# Patient Record
Sex: MALE | Race: BLACK OR AFRICAN AMERICAN | NOT HISPANIC OR LATINO | URBAN - METROPOLITAN AREA
[De-identification: names, ages, dates, MRNs, and addresses within clinical notes are randomized per-mention and may not be internally consistent; named-entity substitution may affect disease eponyms.]

---

## 2018-11-06 ENCOUNTER — HISTORICAL (OUTPATIENT)
Dept: ADMINISTRATIVE | Facility: HOSPITAL | Age: 38
End: 2018-11-06

## 2018-11-06 LAB
CHOLEST SERPL-MCNC: 128 MG/DL
CHOLEST/HDLC SERPL: 2.7 {RATIO} (ref 0–5)
HDLC SERPL-MCNC: 47 MG/DL
LDLC SERPL CALC-MCNC: 74 MG/DL (ref 0–130)
TRIGL SERPL-MCNC: 36 MG/DL
VLDLC SERPL CALC-MCNC: 7 MG/DL

## 2019-10-21 ENCOUNTER — HISTORICAL (OUTPATIENT)
Dept: ADMINISTRATIVE | Facility: HOSPITAL | Age: 39
End: 2019-10-21

## 2019-10-21 LAB
ABS NEUT (OLG): 2.26 X10(3)/MCL (ref 2.1–9.2)
ALBUMIN SERPL-MCNC: 4.1 GM/DL (ref 3.4–5)
ALBUMIN/GLOB SERPL: 1.1 RATIO (ref 1.1–2)
ALP SERPL-CCNC: 64 UNIT/L (ref 45–117)
ALT SERPL-CCNC: 39 UNIT/L (ref 12–78)
AST SERPL-CCNC: 27 UNIT/L (ref 15–37)
BASOPHILS # BLD AUTO: 0 X10(3)/MCL (ref 0–0.2)
BASOPHILS NFR BLD AUTO: 0 %
BILIRUB SERPL-MCNC: 1.5 MG/DL (ref 0.2–1)
BILIRUBIN DIRECT+TOT PNL SERPL-MCNC: 0.4 MG/DL (ref 0–0.2)
BILIRUBIN DIRECT+TOT PNL SERPL-MCNC: 1.1 MG/DL
BUN SERPL-MCNC: 12 MG/DL (ref 7–18)
CALCIUM SERPL-MCNC: 9 MG/DL (ref 8.5–10.1)
CHLORIDE SERPL-SCNC: 106 MMOL/L (ref 98–107)
CHOLEST SERPL-MCNC: 135 MG/DL
CHOLEST/HDLC SERPL: 2.5 {RATIO} (ref 0–5)
CO2 SERPL-SCNC: 27 MMOL/L (ref 21–32)
CREAT SERPL-MCNC: 1.1 MG/DL (ref 0.6–1.3)
EOSINOPHIL # BLD AUTO: 0 X10(3)/MCL (ref 0–0.9)
EOSINOPHIL NFR BLD AUTO: 0 %
ERYTHROCYTE [DISTWIDTH] IN BLOOD BY AUTOMATED COUNT: 12.4 % (ref 11.5–14.5)
GLOBULIN SER-MCNC: 3.6 GM/ML (ref 2.3–3.5)
GLUCOSE SERPL-MCNC: 97 MG/DL (ref 74–106)
HCT VFR BLD AUTO: 45.2 % (ref 40–51)
HDLC SERPL-MCNC: 55 MG/DL (ref 40–59)
HGB BLD-MCNC: 15.2 GM/DL (ref 13.5–17.5)
IMM GRANULOCYTES # BLD AUTO: 0.01 10*3/UL
IMM GRANULOCYTES NFR BLD AUTO: 0 %
LDLC SERPL CALC-MCNC: 72 MG/DL
LYMPHOCYTES # BLD AUTO: 1.5 X10(3)/MCL (ref 0.6–4.6)
LYMPHOCYTES NFR BLD AUTO: 36 %
MCH RBC QN AUTO: 29.6 PG (ref 26–34)
MCHC RBC AUTO-ENTMCNC: 33.6 GM/DL (ref 31–37)
MCV RBC AUTO: 87.9 FL (ref 80–100)
MONOCYTES # BLD AUTO: 0.4 X10(3)/MCL (ref 0.1–1.3)
MONOCYTES NFR BLD AUTO: 10 %
NEUTROPHILS # BLD AUTO: 2.26 X10(3)/MCL (ref 2.1–9.2)
NEUTROPHILS NFR BLD AUTO: 53 %
PLATELET # BLD AUTO: 150 X10(3)/MCL (ref 130–400)
PMV BLD AUTO: 11.6 FL (ref 7.4–10.4)
POTASSIUM SERPL-SCNC: 4 MMOL/L (ref 3.5–5.1)
PROT SERPL-MCNC: 7.7 GM/DL (ref 6.4–8.2)
RBC # BLD AUTO: 5.14 X10(6)/MCL (ref 4.5–5.9)
SODIUM SERPL-SCNC: 138 MMOL/L (ref 136–145)
TRIGL SERPL-MCNC: 38 MG/DL
VLDLC SERPL CALC-MCNC: 8 MG/DL
WBC # SPEC AUTO: 4.3 X10(3)/MCL (ref 4.5–11)

## 2020-12-02 ENCOUNTER — HISTORICAL (OUTPATIENT)
Dept: ADMINISTRATIVE | Facility: HOSPITAL | Age: 40
End: 2020-12-02

## 2020-12-02 LAB
ALBUMIN SERPL-MCNC: 4.2 GM/DL (ref 3.5–5)
ALBUMIN/GLOB SERPL: 1.4 RATIO (ref 1.1–2)
ALP SERPL-CCNC: 65 UNIT/L (ref 40–150)
ALT SERPL-CCNC: 29 UNIT/L (ref 0–55)
AST SERPL-CCNC: 29 UNIT/L (ref 5–34)
BILIRUB SERPL-MCNC: 1.9 MG/DL
BILIRUBIN DIRECT+TOT PNL SERPL-MCNC: 0.6 MG/DL (ref 0–0.5)
BILIRUBIN DIRECT+TOT PNL SERPL-MCNC: 1.3 MG/DL (ref 0–0.8)
BUN SERPL-MCNC: 13 MG/DL (ref 8.9–20.6)
CALCIUM SERPL-MCNC: 8.9 MG/DL (ref 8.4–10.2)
CHLORIDE SERPL-SCNC: 104 MMOL/L (ref 98–107)
CHOLEST SERPL-MCNC: 139 MG/DL
CHOLEST/HDLC SERPL: 3 {RATIO} (ref 0–5)
CO2 SERPL-SCNC: 26 MMOL/L (ref 22–29)
CREAT SERPL-MCNC: 0.97 MG/DL (ref 0.73–1.18)
EST. AVERAGE GLUCOSE BLD GHB EST-MCNC: 111.2 MG/DL
GLOBULIN SER-MCNC: 3.1 GM/DL (ref 2.4–3.5)
GLUCOSE SERPL-MCNC: 99 MG/DL (ref 74–100)
HBA1C MFR BLD: 5.5 %
HDLC SERPL-MCNC: 44 MG/DL (ref 35–60)
LDLC SERPL CALC-MCNC: 87 MG/DL (ref 50–140)
POTASSIUM SERPL-SCNC: 3.4 MMOL/L (ref 3.5–5.1)
PROT SERPL-MCNC: 7.3 GM/DL (ref 6.4–8.3)
SODIUM SERPL-SCNC: 136 MMOL/L (ref 136–145)
TRIGL SERPL-MCNC: 41 MG/DL (ref 34–140)
TSH SERPL-ACNC: 1.41 UIU/ML (ref 0.35–4.94)
VLDLC SERPL CALC-MCNC: 8 MG/DL

## 2021-01-07 ENCOUNTER — HISTORICAL (OUTPATIENT)
Dept: RADIOLOGY | Facility: HOSPITAL | Age: 41
End: 2021-01-07

## 2022-04-10 ENCOUNTER — HISTORICAL (OUTPATIENT)
Dept: ADMINISTRATIVE | Facility: HOSPITAL | Age: 42
End: 2022-04-10

## 2022-04-25 VITALS
DIASTOLIC BLOOD PRESSURE: 87 MMHG | OXYGEN SATURATION: 98 % | WEIGHT: 250 LBS | BODY MASS INDEX: 33.13 KG/M2 | HEIGHT: 73 IN | SYSTOLIC BLOOD PRESSURE: 148 MMHG
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
## 2022-05-03 NOTE — HISTORICAL [facility] CERNER
This is a historical note converted from R[employer]h. Formatting and pictures may have been removed.  Please reference Ramesh for original formatting and attached multimedia. Chief Complaint  F/U APPT FOR HTN  Follow up for HTN  History of Present Illness  Patient is a 40-year-old male with a past medical history of?hypertension?comes to the clinic today for regular follow-up exam.  ?  Hypertension-according to the patient he is?compliant with amlodipine 10 mg once daily. ?He forgot his blood pressure medication to take today in the morning?and is?anxious?regarding his visit today in the clinic. ?He does not measure his blood pressure?regularly.?Denies of blurry vision, headaches, chest pain, shortness of breath, palpitations, diaphoresis, presyncopal or syncopal episodes, bilateral lower extremity edema.  ?  Labs were on 10/2019.  ?  Health management  Declines for flu shot today  Non-smoker, nonalcoholic, non-IVDU.  Review of Systems  Constitutional:?no fever, fatigue, weakness  Respiratory:?no cough, no wheezing, no shortness of breath  Cardiovascular:?no chest pain, no palpitations, no edema  Gastrointestinal:?no nausea, vomiting, or diarrhea. No abdominal pain  Neurologic: no headache, no dizziness, no weakness or numbness  Physical Exam  Vitals & Measurements  T:?36.4? ?C (Oral)? HR:?66(Peripheral)? RR:?20? BP:?147/84?  HT:?185.00?cm? WT:?113.800?kg? BMI:?33.25?  General:?not in acute distress  Respiratory:?clear to auscultation bilaterally. No rales, wheezing, or rhonchi. Chest expansion symmetrical  Cardiovascular:?regular rate and rhythm without murmurs.  Gastrointestinal:?soft, non-tender, non-distended with normal bowel sounds, without masses to palpation  Psychological: Calm and cooperative.  Assessment/Plan  1.?Hypertension?I10  Continue amlodipine 10 mg daily  Patient states that he has white coat hypertension?thus not increasing amlodipine today  Recommend to bring the log?for blood pressure?in his next  visit  Follow low sodium diet (2 grams a day)  Control high blood pressure (?goal BP < 140/90)  Exercise at least 40 minutes a day, 5 days a week.  Recommend?10% of the body weight  Do not take NSAIDs (Ibuprofen, Naproxen, Aleve, Advil, Toradol, Mobic), may take only Tylenol as needed for pain/headaches.  Blood and urine?work results pending.  Follow-up with?log?in a month  Ordered:  amLODIPine, 10 mg = 1 tab(s), Oral, Daily, # 30 tab(s), 6 Refill(s), Pharmacy: [id number], 185, cm, Height/Length Dosing, 12/02/20 9:12:00 CST, 113.8, kg, Weight Dosing, 12/02/20 9:12:00 CST  Clinic Follow up, *Est. 01/02/21 3:00:00 CST, Order for future visit, Hypertension, [facility]  Comprehensive Metabolic Panel, Routine collect, *Est. 12/02/20 3:00:00 CST, Blood, Order for future visit, *Est. Stop date 12/02/20 3:00:00 CST, Lab Collect, Hypertension, Print Label By Order Location, 12/02/20 9:53:00 CST  Hemoglobin A1C [facility], Routine collect, *Est. 12/02/20 3:00:00 CST, Blood, Order for future visit, *Est. Stop date 12/02/20 3:00:00 CST, Lab Collect, Hypertension, Print Label By Order Location, 12/02/20 9:53:00 CST  Lipid Panel, Routine collect, *Est. 12/02/20 3:00:00 CST, Blood, Order for future visit, *Est. Stop date 12/02/20 3:00:00 CST, Lab Collect, Hypertension, Print Label By Order Location, 12/02/20 9:53:00 CST  Microalbum/Creatinine Ratio Urine (Microalb/Creat), Routine collect, Urine, 12/02/20 9:53:00 CST, Stop date 12/02/20 9:56:00 CST, Nurse collect, Hypertension, Print Label By Order Location  Thyroid Stimulating Hormone, Routine collect, 12/02/20 9:53:00 CST, Blood, Order for future visit, Stop date 12/02/20 9:53:00 CST, Lab Collect, Hypertension, 12/02/20 9:53:00 CST  Urinalysis with Microscopic if Indicated, Routine collect, Urine, 12/02/20 9:53:00 CST, Stop date 12/02/20 9:56:00 CST, Nurse collect, Hypertension, Print Label By Order Location  ?   Problem List/Past Medical  History  Ongoing  Hypertension  Obesity  Historical  No qualifying data  Procedure/Surgical History  none   Medications  amlodipine 10 mg oral tablet, 10 mg= 1 tab(s), Oral, Daily, 6 refills  Allergies  No Known Allergies  Social History  Abuse/Neglect  No, No, Yes, 12/02/2020  Alcohol - Denies Alcohol Use, 05/18/2014  Never, 10/21/2019  Employment/School  Employed, 06/17/2019  Exercise  Exercise duration: 20. Exercise frequency: 3-4 times/week. Exercise type: Walking., 06/17/2019  Home/Environment  Lives with Children, Spouse. TV/Computer concerns: No. Single family house, 06/17/2019  Nutrition/Health  Regular, 06/17/2019  Sexual  Sexually active: Yes., 06/17/2019  Spiritual/Cultural  Lutheran, No, 06/17/2019  Substance Use - Denies Substance Abuse, 05/18/2014  Tobacco - Denies Tobacco Use, 05/18/2014  Never (less than 100 in lifetime), N/A, Household tobacco concerns: No. Smokeless Tobacco Use: Never., 12/02/2020  Family History  Hypertension.: Father and Brother.  Immunizations  Vaccine Date Status   tetanus/diphtheria/pertussis, acel(Tdap) 01/14/2020 Given   Health Maintenance  Health Maintenance  ???Pending?(in the next year)  ??? ??OverDue  ??? ? ? ?Alcohol Misuse Screening due??01/02/20??and every 1??year(s)  ??? ? ? ?Hypertension Management-BMP due??10/20/20??and every 1??year(s)  ??? ??Due In Future?  ??? ? ? ?Obesity Screening not due until??01/01/21??and every 1??year(s)  ??? ? ? ?ADL Screening not due until??01/14/21??and every 1??year(s)  ???Satisfied?(in the past 1 year)  ??? ??Satisfied?  ??? ? ? ?ADL Screening on??01/14/20.??Satisfied by Marleny Cabrera LPN  ??? ? ? ?Blood Pressure Screening on??12/02/20.??Satisfied by Merly Haas LPN  ??? ? ? ?Body Mass Index Check on??12/02/20.??Satisfied by Merly Haas LPN  ??? ? ? ?Depression Screening on??12/02/20.??Satisfied by Merly Haas LPN  ??? ? ? ?Hypertension Management-Blood Pressure on??12/02/20.??Satisfied by Samina REVELES,  Merly  ??? ? ? ?Influenza Vaccine on??12/02/20.??Satisfied by Merly Haas LPN  ??? ? ? ?Obesity Screening on??12/02/20.??Satisfied by Merly Haas LPN  ??? ? ? ?Tetanus Vaccine on??01/14/20.??Satisfied by Rosa Alaniz  ?      Called the patient to let him know about his lab work results. Also gave an update regarding elevated bilirubin. He agrees to the plan of getting an abdominal US   ?I have read the residents note?and the management and plan are reasonable and appropriate.

## 2022-07-21 RX ORDER — AMLODIPINE BESYLATE 10 MG/1
10 TABLET ORAL DAILY
COMMUNITY
Start: 2022-06-20 | End: 2022-07-21 | Stop reason: SDUPTHER

## 2022-07-22 RX ORDER — AMLODIPINE BESYLATE 10 MG/1
10 TABLET ORAL DAILY
Qty: 30 TABLET | Refills: 0 | Status: SHIPPED | OUTPATIENT
Start: 2022-07-22 | End: 2022-09-23 | Stop reason: SDUPTHER

## 2022-09-23 ENCOUNTER — OFFICE VISIT (OUTPATIENT)
Dept: FAMILY MEDICINE | Facility: CLINIC | Age: 42
End: 2022-09-23
Payer: COMMERCIAL

## 2022-09-23 VITALS
OXYGEN SATURATION: 98 % | BODY MASS INDEX: 32.86 KG/M2 | RESPIRATION RATE: 18 BRPM | HEIGHT: 72 IN | SYSTOLIC BLOOD PRESSURE: 146 MMHG | DIASTOLIC BLOOD PRESSURE: 80 MMHG | WEIGHT: 242.63 LBS | HEART RATE: 91 BPM | TEMPERATURE: 98 F

## 2022-09-23 DIAGNOSIS — I10 HYPERTENSION, UNSPECIFIED TYPE: Primary | ICD-10-CM

## 2022-09-23 PROCEDURE — 99214 OFFICE O/P EST MOD 30 MIN: CPT | Mod: PBBFAC

## 2022-09-23 RX ORDER — CYCLOBENZAPRINE HCL 10 MG
10 TABLET ORAL 3 TIMES DAILY
COMMUNITY
Start: 2022-06-23

## 2022-09-23 RX ORDER — HYDROCODONE BITARTRATE AND ACETAMINOPHEN 7.5; 325 MG/1; MG/1
1 TABLET ORAL 2 TIMES DAILY
COMMUNITY
Start: 2022-06-23 | End: 2023-04-03

## 2022-09-23 RX ORDER — SILDENAFIL 25 MG/1
25 TABLET, FILM COATED ORAL DAILY
Qty: 6 TABLET | Refills: 6 | Status: SHIPPED | OUTPATIENT
Start: 2022-09-23 | End: 2023-03-25 | Stop reason: SDUPTHER

## 2022-09-23 RX ORDER — SILDENAFIL 25 MG/1
25 TABLET, FILM COATED ORAL DAILY
COMMUNITY
Start: 2022-06-14 | End: 2022-09-23 | Stop reason: SDUPTHER

## 2022-09-23 RX ORDER — MELOXICAM 15 MG/1
15 TABLET ORAL DAILY
COMMUNITY
Start: 2022-07-19

## 2022-09-23 RX ORDER — AMLODIPINE BESYLATE 10 MG/1
10 TABLET ORAL DAILY
Qty: 90 TABLET | Refills: 1 | Status: SHIPPED | OUTPATIENT
Start: 2022-09-23 | End: 2023-03-25 | Stop reason: SDUPTHER

## 2022-09-23 NOTE — PROGRESS NOTES
[facility] OFFICE VISIT NOTE  Avi Crawford Jr  [id number]  09/23/2022      Chief Complaint: Medication Refill      HPI    Avi Crawford Jr is a 42 y.o. male  presenting to [facility] for follow up of HTN.    Hypertension   -Compliant with amlodipine 10 mg daily  -Denies chest pain, shortness a breath, headaches   -BP at home: 130-140/80's  -did not take medication this morning.  Has been out of his medication for least a week.    Sexual problem  -patient was given Viagra at his last apt and requesting refills  -does not take nitrates       ROS:  CONSTITUTIONAL: No weight loss, fever, chills, or weakness.    CARDIOVASCULAR: No chest pain, chest pressure, or chest discomfort. No palpitations.    RESPIRATORY: No shortness of breath, cough, or sputum.    GASTROINTESTINAL: No nausea, vomiting or diarrhea. No abdominal pain.    Vitals:    09/23/22 0932   BP: (!) 146/80   Pulse: 91   Resp: 18   Temp: 98.4 °F (36.9 °C)       PE:  General: appears well, in no acute distress   Neck: no carotid bruits   Respiratory: clear to auscultation bilaterally, nonlabored respirations   Cardiovascular: regular rate and rhythm without murmurs or gallops, no edema in bilateral lower extremities     Current Medications:   Current Outpatient Medications   Medication Sig Dispense Refill    HYDROcodone-acetaminophen (NORCO) 7.5-325 mg per tablet Take 1 tablet by mouth 2 (two) times daily.      meloxicam (MOBIC) 15 MG tablet Take 15 mg by mouth once daily.      amLODIPine (NORVASC) 10 MG tablet Take 1 tablet (10 mg total) by mouth once daily. 90 tablet 1    cyclobenzaprine (FLEXERIL) 10 MG tablet Take 10 mg by mouth 3 (three) times daily.      sildenafiL (VIAGRA) 25 MG tablet Take 1 tablet (25 mg total) by mouth once daily. 6 tablet 6     No current facility-administered medications for this visit.       Assessment:   1. Hypertension, unspecified type    2. Abnormal sexual function        Plan:    HTN  -Continue amlodipine 10 mg qd, refills  provided today  -Low Sodium Diet (DASH Diet - Less than 2 grams of sodium per day).  -Monitor blood pressure daily and log. Report consistent numbers greater than 140/90.  -Maintain healthy weight with goal BMI <30. Exercise 30 minutes per day, 5 days per week.    Sexual Problem  -continue Viagra 25 mg  PRN, refills provided today   -avoid nitrates    Return to clinic in 6 months     Marivel Lawton M.D.  [facility]

## 2022-09-24 NOTE — PROGRESS NOTES
Date of encounter 09-23-22.  Resident's note reviewed 09-24-22.  Agree with assessment; plan of care appropriate.  Professional services provided in an outpatient primary care center affiliated with a teaching institution.

## 2023-03-25 DIAGNOSIS — F52.9 PROBLEM WITH SEXUAL FUNCTION: ICD-10-CM

## 2023-03-25 DIAGNOSIS — I10 HYPERTENSION, UNSPECIFIED TYPE: ICD-10-CM

## 2023-03-25 RX ORDER — AMLODIPINE BESYLATE 10 MG/1
10 TABLET ORAL DAILY
Qty: 30 TABLET | Refills: 0 | Status: SHIPPED | OUTPATIENT
Start: 2023-03-25 | End: 2023-04-03 | Stop reason: SDUPTHER

## 2023-03-25 RX ORDER — SILDENAFIL 25 MG/1
25 TABLET, FILM COATED ORAL DAILY
Qty: 10 TABLET | Refills: 0 | Status: SHIPPED | OUTPATIENT
Start: 2023-03-25 | End: 2023-06-26 | Stop reason: SDUPTHER

## 2023-04-03 ENCOUNTER — OFFICE VISIT (OUTPATIENT)
Dept: FAMILY MEDICINE | Facility: CLINIC | Age: 43
End: 2023-04-03
Payer: MEDICAID

## 2023-04-03 VITALS
TEMPERATURE: 99 F | WEIGHT: 262.81 LBS | BODY MASS INDEX: 35.6 KG/M2 | SYSTOLIC BLOOD PRESSURE: 130 MMHG | OXYGEN SATURATION: 100 % | RESPIRATION RATE: 18 BRPM | DIASTOLIC BLOOD PRESSURE: 82 MMHG | HEIGHT: 72 IN | HEART RATE: 84 BPM

## 2023-04-03 DIAGNOSIS — I10 HYPERTENSION, UNSPECIFIED TYPE: Primary | ICD-10-CM

## 2023-04-03 LAB
ALBUMIN SERPL-MCNC: 4 G/DL (ref 3.5–5)
ALBUMIN/GLOB SERPL: 1.3 RATIO (ref 1.1–2)
ALP SERPL-CCNC: 64 UNIT/L (ref 40–150)
ALT SERPL-CCNC: 25 UNIT/L (ref 0–55)
AST SERPL-CCNC: 25 UNIT/L (ref 5–34)
BASOPHILS # BLD AUTO: 0.01 X10(3)/MCL (ref 0–0.2)
BASOPHILS NFR BLD AUTO: 0.2 %
BILIRUBIN DIRECT+TOT PNL SERPL-MCNC: 1.3 MG/DL
BUN SERPL-MCNC: 10.1 MG/DL (ref 8.9–20.6)
CALCIUM SERPL-MCNC: 9.1 MG/DL (ref 8.4–10.2)
CHLORIDE SERPL-SCNC: 105 MMOL/L (ref 98–107)
CHOLEST SERPL-MCNC: 146 MG/DL
CHOLEST/HDLC SERPL: 3 {RATIO} (ref 0–5)
CO2 SERPL-SCNC: 27 MMOL/L (ref 22–29)
CREAT SERPL-MCNC: 1.02 MG/DL (ref 0.73–1.18)
EOSINOPHIL # BLD AUTO: 0.07 X10(3)/MCL (ref 0–0.9)
EOSINOPHIL NFR BLD AUTO: 1.4 %
ERYTHROCYTE [DISTWIDTH] IN BLOOD BY AUTOMATED COUNT: 13.2 % (ref 11.5–17)
GFR SERPLBLD CREATININE-BSD FMLA CKD-EPI: >60 MLS/MIN/1.73/M2
GLOBULIN SER-MCNC: 3.1 GM/DL (ref 2.4–3.5)
GLUCOSE SERPL-MCNC: 87 MG/DL (ref 74–100)
HCT VFR BLD AUTO: 40.5 % (ref 42–52)
HDLC SERPL-MCNC: 45 MG/DL (ref 35–60)
HGB BLD-MCNC: 13.5 G/DL (ref 14–18)
IMM GRANULOCYTES # BLD AUTO: 0.02 X10(3)/MCL (ref 0–0.04)
IMM GRANULOCYTES NFR BLD AUTO: 0.4 %
LDLC SERPL CALC-MCNC: 86 MG/DL (ref 50–140)
LYMPHOCYTES # BLD AUTO: 2.17 X10(3)/MCL (ref 0.6–4.6)
LYMPHOCYTES NFR BLD AUTO: 42.6 %
MCH RBC QN AUTO: 29.6 PG (ref 27–31)
MCHC RBC AUTO-ENTMCNC: 33.3 G/DL (ref 33–36)
MCV RBC AUTO: 88.8 FL (ref 80–94)
MONOCYTES # BLD AUTO: 0.52 X10(3)/MCL (ref 0.1–1.3)
MONOCYTES NFR BLD AUTO: 10.2 %
NEUTROPHILS # BLD AUTO: 2.3 X10(3)/MCL (ref 2.1–9.2)
NEUTROPHILS NFR BLD AUTO: 45.2 %
NRBC BLD AUTO-RTO: 0 %
PLATELET # BLD AUTO: 162 X10(3)/MCL (ref 130–400)
PMV BLD AUTO: 11.3 FL (ref 7.4–10.4)
POTASSIUM SERPL-SCNC: 3.5 MMOL/L (ref 3.5–5.1)
PROT SERPL-MCNC: 7.1 GM/DL (ref 6.4–8.3)
RBC # BLD AUTO: 4.56 X10(6)/MCL (ref 4.7–6.1)
SODIUM SERPL-SCNC: 138 MMOL/L (ref 136–145)
TRIGL SERPL-MCNC: 75 MG/DL (ref 34–140)
VLDLC SERPL CALC-MCNC: 15 MG/DL
WBC # SPEC AUTO: 5.1 X10(3)/MCL (ref 4.5–11.5)

## 2023-04-03 PROCEDURE — 99214 OFFICE O/P EST MOD 30 MIN: CPT | Mod: PBBFAC

## 2023-04-03 PROCEDURE — 80053 COMPREHEN METABOLIC PANEL: CPT

## 2023-04-03 PROCEDURE — 80061 LIPID PANEL: CPT

## 2023-04-03 PROCEDURE — 36415 COLL VENOUS BLD VENIPUNCTURE: CPT

## 2023-04-03 PROCEDURE — 85025 COMPLETE CBC W/AUTO DIFF WBC: CPT

## 2023-04-03 RX ORDER — AMLODIPINE BESYLATE 10 MG/1
10 TABLET ORAL DAILY
Qty: 90 TABLET | Refills: 1 | Status: SHIPPED | OUTPATIENT
Start: 2023-04-03 | End: 2023-10-12 | Stop reason: SDUPTHER

## 2023-04-03 RX ORDER — NALOXONE HYDROCHLORIDE 4 MG/.1ML
SPRAY NASAL
COMMUNITY
Start: 2023-03-10

## 2023-04-03 RX ORDER — HYDROCODONE BITARTRATE AND ACETAMINOPHEN 10; 325 MG/1; MG/1
1 TABLET ORAL 4 TIMES DAILY
COMMUNITY
Start: 2023-03-23

## 2023-04-03 NOTE — PROGRESS NOTES
Family Medicine Clinic Note     Subjective     Patient ID: Avi Crawford Jr is a 42 y.o. male.    Chief Complaint: Back Pain    Pt is a 41 yo M with PMH of HTN and ED; presenting to clinic today for follow up of HTN. Pt had recent back surgery (3/8/23) for injury he received over a year ago while working. Feeling well after surgery; has been able to be up walking around; still on pain regimen. Following with his surgeon. Denied any other medical history changes. No other acute complaints today. Denied need for medication refills today.     Chronic Problems:  HTN - current regimen Norvasc 10mg, tolerates well. Checks BP at home; has been high (140s/90s) recently while he was in pain; otherwise runs lower.   ED - currently on Viagra; tolerates well.     Specialists:   Ortho: following with surgeon; next apt in 1 month    Review of Systems   Constitutional:  Negative for chills and fever.   Respiratory:  Negative for shortness of breath.    Cardiovascular:  Negative for chest pain and leg swelling.   Gastrointestinal:  Negative for abdominal pain and nausea.   Genitourinary:  Negative for dysuria, frequency and urgency.   Musculoskeletal:  Positive for back pain.   Neurological:  Negative for weakness and headaches.      Objective     Vitals:    04/03/23 1455   BP: 130/82   BP Location: Right arm   Patient Position: Sitting   BP Method: Large (Automatic)   Pulse: 84   Resp: 18   Temp: 98.8 °F (37.1 °C)   TempSrc: Oral   SpO2: 100%   Weight: 119.2 kg (262 lb 12.8 oz)   Height: 6' (1.829 m)      Physical Exam  Vitals and nursing note reviewed.   Constitutional:       General: He is not in acute distress.     Appearance: Normal appearance.      Comments: Back brace in place   Cardiovascular:      Rate and Rhythm: Normal rate and regular rhythm.      Heart sounds: Normal heart sounds. No murmur heard.  Pulmonary:      Effort: Pulmonary effort is normal. No respiratory distress.      Breath sounds: Normal breath sounds.  No wheezing.   Chest:      Chest wall: No tenderness.   Abdominal:      Comments: Unable to assess due to back brace   Musculoskeletal:      Right lower leg: No edema.      Left lower leg: No edema.   Skin:     General: Skin is warm.   Neurological:      Mental Status: He is oriented to person, place, and time.      Sensory: No sensory deficit.      Motor: No weakness.     Assessment and Plan    Hypertension, unspecified type  Continue Norvasc 10mg   Continue to monitor BP at home   Encouraged maintaining healthy weight, low salt diet and exercise atleast 30 min per 5 days   Updated lab work ordered today   -     CBC Auto Differential; Future; Expected date: 04/03/2023  -     Comprehensive Metabolic Panel; Future; Expected date: 04/03/2023  -     Lipid Panel; Future; Expected date: 04/03/2023     Follow up in about 6 months (around 10/3/2023) for htn .    Health Maintenance    Blood Work - Ordered today; pt adamantly denied screening test for HIV and Hep C today   Immunizations - KERI Quach MD  [facility]

## 2023-04-03 NOTE — PROGRESS NOTES
I have seen the patient, reviewed the resident's history and physical, assessment, plan, and progress note. I have personally interviewed and examined the patient at bedside and: agree with the findings.     Neptali Toro MD  [facility]

## 2023-06-13 ENCOUNTER — PATIENT MESSAGE (OUTPATIENT)
Dept: PEDIATRICS UNIT | Facility: HOSPITAL | Age: 43
End: 2023-06-13
Payer: MEDICAID

## 2023-06-13 DIAGNOSIS — F52.9 PROBLEM WITH SEXUAL FUNCTION: ICD-10-CM

## 2023-06-13 RX ORDER — SILDENAFIL 25 MG/1
25 TABLET, FILM COATED ORAL DAILY
Qty: 10 TABLET | Refills: 0 | OUTPATIENT
Start: 2023-06-13 | End: 2023-12-10

## 2023-06-20 DIAGNOSIS — F52.9 PROBLEM WITH SEXUAL FUNCTION: ICD-10-CM

## 2023-06-22 RX ORDER — SILDENAFIL 25 MG/1
25 TABLET, FILM COATED ORAL DAILY
Qty: 10 TABLET | Refills: 0 | OUTPATIENT
Start: 2023-06-22 | End: 2023-12-19

## 2023-06-26 DIAGNOSIS — F52.9 PROBLEM WITH SEXUAL FUNCTION: ICD-10-CM

## 2023-06-26 RX ORDER — SILDENAFIL 25 MG/1
25 TABLET, FILM COATED ORAL DAILY
Qty: 30 TABLET | Refills: 0 | Status: SHIPPED | OUTPATIENT
Start: 2023-06-26 | End: 2023-10-12 | Stop reason: SDUPTHER

## 2023-10-12 DIAGNOSIS — F52.9 PROBLEM WITH SEXUAL FUNCTION: ICD-10-CM

## 2023-10-12 DIAGNOSIS — I10 HYPERTENSION, UNSPECIFIED TYPE: ICD-10-CM

## 2023-10-12 RX ORDER — AMLODIPINE BESYLATE 10 MG/1
10 TABLET ORAL DAILY
Qty: 90 TABLET | Refills: 1 | Status: SHIPPED | OUTPATIENT
Start: 2023-10-12 | End: 2024-02-12

## 2023-10-12 RX ORDER — SILDENAFIL 25 MG/1
25 TABLET, FILM COATED ORAL DAILY
Qty: 30 TABLET | Refills: 0 | Status: CANCELLED | OUTPATIENT
Start: 2023-10-12 | End: 2024-01-10

## 2023-10-12 RX ORDER — SILDENAFIL 25 MG/1
25 TABLET, FILM COATED ORAL DAILY
Qty: 30 TABLET | Refills: 0 | Status: SHIPPED | OUTPATIENT
Start: 2023-10-12 | End: 2024-02-12

## 2023-10-12 NOTE — TELEPHONE ENCOUNTER
Refill of Amlodipine and Viagra sent.     Aman Quach MD  [facility]

## 2024-02-10 NOTE — PROGRESS NOTES
Family Medicine Clinic Note     Subjective     Patient ID: Avi Crawford Jr is a 43 y.o. male.    Chief Complaint: Hypertension    Pt is a 44 yo M with PMH of HTN and ED; presenting to clinic today for follow up of HTN. Denied any other medical history changes. Concern of increased worry and thinking today. Denied need for medication refills today.     HTN:  -Takes amlodipine however has noticed it has caused his legs to swell  -/86 with medication   -Denied any chest pain, palpitations, SOB on exertion     Worry and increased thinking:   -Has been present since he had his back surgery last year  -Feels like he can't shut his mind off; causes him to be very distracted  -Denied any acute panic attacks  -PHQ 9 score 16  -States Ortho gave him Elavil to take to help; not on medication list and pt states he is no longer taking it      PMHx:   Spinal fusion and L knee OA: following with surgeon for back and knee; manages his pain  HTN - change amlodipine to HCTZ  ED - on Viagra tolerates well    Review of Systems   Constitutional:  Negative for malaise/fatigue.   Eyes:  Negative for blurred vision.   Respiratory:  Negative for shortness of breath.    Cardiovascular:  Negative for chest pain, palpitations, orthopnea and PND.   Musculoskeletal:  Negative for neck pain.   Neurological:  Negative for headaches.      Objective     Vitals:    02/12/24 1303   BP: 139/86   BP Location: Left arm   Patient Position: Sitting   BP Method: Large (Automatic)   Pulse: 68   Temp: 98.1 °F (36.7 °C)   TempSrc: Oral   SpO2: 100%   Weight: 112.1 kg (247 lb 3.2 oz)   Height: 6' (1.829 m)       Medication List with Changes/Refills   New Medications    FLUOXETINE 10 MG CAPSULE    Take 1 capsule (10 mg total) by mouth once daily.    HYDROCHLOROTHIAZIDE (HYDRODIURIL) 12.5 MG TAB    Take 1 tablet (12.5 mg total) by mouth once daily.   Current Medications    CYCLOBENZAPRINE (FLEXERIL) 10 MG TABLET    Take 10 mg by mouth 3 (three) times  daily.    HYDROCODONE-ACETAMINOPHEN (NORCO)  MG PER TABLET    Take 1 tablet by mouth 4 (four) times daily.    MELOXICAM (MOBIC) 15 MG TABLET    Take 15 mg by mouth once daily.    NALOXONE (NARCAN) 4 MG/ACTUATION SPRY    SMARTSIG:Spray(s) In Nostril   Changed and/or Refilled Medications    Modified Medication Previous Medication    SILDENAFIL (VIAGRA) 50 MG TABLET sildenafiL (VIAGRA) 50 MG tablet       Take 1 tablet (50 mg total) by mouth daily as needed for Erectile Dysfunction.    50 mg.   Discontinued Medications    AMLODIPINE (NORVASC) 10 MG TABLET    Take 1 tablet (10 mg total) by mouth once daily.    SILDENAFIL (VIAGRA) 25 MG TABLET    Take 1 tablet (25 mg total) by mouth once daily.      Physical Exam  Vitals and nursing note reviewed.   Constitutional:       General: He is not in acute distress.     Appearance: Normal appearance.   Cardiovascular:      Rate and Rhythm: Normal rate and regular rhythm.      Heart sounds: Normal heart sounds. No murmur heard.  Pulmonary:      Effort: Pulmonary effort is normal. No respiratory distress.      Breath sounds: Normal breath sounds. No wheezing.   Chest:      Chest wall: No tenderness.   Abdominal:      General: Bowel sounds are normal.      Palpations: Abdomen is soft.      Tenderness: There is no abdominal tenderness. There is no guarding.   Musculoskeletal:      Right lower leg: No edema.      Left lower leg: No edema.   Skin:     General: Skin is warm.   Neurological:      Mental Status: He is oriented to person, place, and time.      Sensory: No sensory deficit.      Motor: No weakness.       Assessment and Plan      1. Hypertension, unspecified type    2. Problem with sexual function    3. Anxiety      -Pt unable to tolerate Norvasc given BLE edema noted when he takes medication. Will change medication to HCTZ today. Medication discussed with pt.   -Elevated PHQ 9 score of 16 today. Believe pt dealing with anxiety at this time; no previously known mental  health concerns. Will initiate pt on Fluoxetine today. Pt agreeable to try medication; risks and benefits discussed in depth and pt expressed understanding. Advised to not take Elavil anymore.   -Updated lab work ordered to be completed prior to next apt.   -Refill of viagra sent today    Orders:   -     hydroCHLOROthiazide (HYDRODIURIL) 12.5 MG Tab; Take 1 tablet (12.5 mg total) by mouth once daily.  -     CBC Auto Differential; Future  -     Comprehensive Metabolic Panel; Future  -     Lipid Panel; Future  -     Hemoglobin A1C; Future  -     sildenafiL (VIAGRA) 50 MG tablet; Take 1 tablet (50 mg total) by mouth daily as needed for Erectile Dysfunction.  -     FLUoxetine 10 MG capsule; Take 1 capsule (10 mg total) by mouth once daily.  -     Vitamin D; Future  -     TSH; Future  -     T4, Free; Future        Follow up in about 2 months (around 4/12/2024) for BP check .    Health Maintenance    Blood Work - 4/2023; pt adamantly denied screening test for HIV and Hep C today   Immunizations - KERI Quach MD  [facility]

## 2024-02-12 ENCOUNTER — OFFICE VISIT (OUTPATIENT)
Dept: FAMILY MEDICINE | Facility: CLINIC | Age: 44
End: 2024-02-12
Payer: MEDICAID

## 2024-02-12 VITALS
OXYGEN SATURATION: 100 % | SYSTOLIC BLOOD PRESSURE: 139 MMHG | WEIGHT: 247.19 LBS | HEART RATE: 68 BPM | BODY MASS INDEX: 33.48 KG/M2 | DIASTOLIC BLOOD PRESSURE: 86 MMHG | TEMPERATURE: 98 F | HEIGHT: 72 IN

## 2024-02-12 DIAGNOSIS — F52.9 PROBLEM WITH SEXUAL FUNCTION: ICD-10-CM

## 2024-02-12 DIAGNOSIS — F41.9 ANXIETY: ICD-10-CM

## 2024-02-12 DIAGNOSIS — I10 HYPERTENSION, UNSPECIFIED TYPE: Primary | ICD-10-CM

## 2024-02-12 PROCEDURE — 99215 OFFICE O/P EST HI 40 MIN: CPT | Mod: PBBFAC

## 2024-02-12 RX ORDER — SILDENAFIL 50 MG/1
50 TABLET, FILM COATED ORAL
COMMUNITY
Start: 2024-01-22 | End: 2024-02-12 | Stop reason: SDUPTHER

## 2024-02-12 RX ORDER — HYDROCHLOROTHIAZIDE 12.5 MG/1
12.5 TABLET ORAL DAILY
Qty: 30 TABLET | Refills: 2 | Status: SHIPPED | OUTPATIENT
Start: 2024-02-12 | End: 2024-05-06 | Stop reason: SDUPTHER

## 2024-02-12 RX ORDER — FLUOXETINE 10 MG/1
10 CAPSULE ORAL DAILY
Qty: 60 CAPSULE | Refills: 0 | Status: SHIPPED | OUTPATIENT
Start: 2024-02-12 | End: 2024-04-12

## 2024-02-12 RX ORDER — SILDENAFIL 50 MG/1
50 TABLET, FILM COATED ORAL DAILY PRN
Qty: 30 TABLET | Refills: 2 | Status: SHIPPED | OUTPATIENT
Start: 2024-02-12 | End: 2024-05-12

## 2024-02-14 NOTE — PROGRESS NOTES
I have discussed the case with the resident and reviewed the resident's history and physical, assessment, plan, and progress note. I agree with the findings.       Neptali Toro MD  [facility]

## 2024-05-06 DIAGNOSIS — I10 HYPERTENSION, UNSPECIFIED TYPE: ICD-10-CM

## 2024-05-06 RX ORDER — HYDROCHLOROTHIAZIDE 12.5 MG/1
12.5 TABLET ORAL DAILY
Qty: 30 TABLET | Refills: 2 | Status: SHIPPED | OUTPATIENT
Start: 2024-05-06 | End: 2024-08-04

## 2024-07-17 ENCOUNTER — OFFICE VISIT (OUTPATIENT)
Dept: FAMILY MEDICINE | Facility: CLINIC | Age: 44
End: 2024-07-17
Payer: MEDICAID

## 2024-07-17 VITALS
OXYGEN SATURATION: 100 % | HEART RATE: 74 BPM | BODY MASS INDEX: 33.35 KG/M2 | HEIGHT: 72 IN | TEMPERATURE: 99 F | WEIGHT: 246.19 LBS | DIASTOLIC BLOOD PRESSURE: 78 MMHG | SYSTOLIC BLOOD PRESSURE: 135 MMHG

## 2024-07-17 DIAGNOSIS — E55.9 VITAMIN D DEFICIENCY: ICD-10-CM

## 2024-07-17 DIAGNOSIS — F52.9 PROBLEM WITH SEXUAL FUNCTION: ICD-10-CM

## 2024-07-17 DIAGNOSIS — F41.9 ANXIETY: Primary | ICD-10-CM

## 2024-07-17 DIAGNOSIS — I10 HYPERTENSION, UNSPECIFIED TYPE: ICD-10-CM

## 2024-07-17 LAB
25(OH)D3+25(OH)D2 SERPL-MCNC: 27 NG/ML (ref 30–80)
ALBUMIN SERPL-MCNC: 3.7 G/DL (ref 3.5–5)
ALBUMIN/GLOB SERPL: 1.4 RATIO (ref 1.1–2)
ALP SERPL-CCNC: 46 UNIT/L (ref 40–150)
ALT SERPL-CCNC: 24 UNIT/L (ref 0–55)
ANION GAP SERPL CALC-SCNC: 6 MEQ/L
AST SERPL-CCNC: 22 UNIT/L (ref 5–34)
BASOPHILS # BLD AUTO: 0.02 X10(3)/MCL
BASOPHILS NFR BLD AUTO: 0.3 %
BILIRUB SERPL-MCNC: 1.4 MG/DL
BUN SERPL-MCNC: 19.4 MG/DL (ref 8.9–20.6)
CALCIUM SERPL-MCNC: 8.8 MG/DL (ref 8.4–10.2)
CHLORIDE SERPL-SCNC: 103 MMOL/L (ref 98–107)
CHOLEST SERPL-MCNC: 125 MG/DL
CHOLEST/HDLC SERPL: 2 {RATIO} (ref 0–5)
CO2 SERPL-SCNC: 30 MMOL/L (ref 22–29)
CREAT SERPL-MCNC: 1.06 MG/DL (ref 0.73–1.18)
CREAT/UREA NIT SERPL: 18
EOSINOPHIL # BLD AUTO: 0.03 X10(3)/MCL (ref 0–0.9)
EOSINOPHIL NFR BLD AUTO: 0.4 %
ERYTHROCYTE [DISTWIDTH] IN BLOOD BY AUTOMATED COUNT: 12.5 % (ref 11.5–17)
EST. AVERAGE GLUCOSE BLD GHB EST-MCNC: 108.3 MG/DL
GFR SERPLBLD CREATININE-BSD FMLA CKD-EPI: >60 ML/MIN/1.73/M2
GLOBULIN SER-MCNC: 2.7 GM/DL (ref 2.4–3.5)
GLUCOSE SERPL-MCNC: 89 MG/DL (ref 74–100)
HBA1C MFR BLD: 5.4 %
HCT VFR BLD AUTO: 42.2 % (ref 42–52)
HCV AB SERPL QL IA: NONREACTIVE
HDLC SERPL-MCNC: 54 MG/DL (ref 35–60)
HGB BLD-MCNC: 14.7 G/DL (ref 14–18)
HIV 1+2 AB+HIV1 P24 AG SERPL QL IA: NONREACTIVE
IMM GRANULOCYTES # BLD AUTO: 0.02 X10(3)/MCL (ref 0–0.04)
IMM GRANULOCYTES NFR BLD AUTO: 0.3 %
LDLC SERPL CALC-MCNC: 55 MG/DL (ref 50–140)
LYMPHOCYTES # BLD AUTO: 3.11 X10(3)/MCL (ref 0.6–4.6)
LYMPHOCYTES NFR BLD AUTO: 43.1 %
MCH RBC QN AUTO: 31.1 PG (ref 27–31)
MCHC RBC AUTO-ENTMCNC: 34.8 G/DL (ref 33–36)
MCV RBC AUTO: 89.2 FL (ref 80–94)
MONOCYTES # BLD AUTO: 0.64 X10(3)/MCL (ref 0.1–1.3)
MONOCYTES NFR BLD AUTO: 8.9 %
NEUTROPHILS # BLD AUTO: 3.39 X10(3)/MCL (ref 2.1–9.2)
NEUTROPHILS NFR BLD AUTO: 47 %
NRBC BLD AUTO-RTO: 0 %
PLATELET # BLD AUTO: 135 X10(3)/MCL (ref 130–400)
PLATELETS.RETICULATED NFR BLD AUTO: 5.2 % (ref 0.9–11.2)
PMV BLD AUTO: 11 FL (ref 7.4–10.4)
POTASSIUM SERPL-SCNC: 3.2 MMOL/L (ref 3.5–5.1)
PROT SERPL-MCNC: 6.4 GM/DL (ref 6.4–8.3)
RBC # BLD AUTO: 4.73 X10(6)/MCL (ref 4.7–6.1)
SODIUM SERPL-SCNC: 139 MMOL/L (ref 136–145)
T4 FREE SERPL-MCNC: 0.96 NG/DL (ref 0.7–1.48)
TRIGL SERPL-MCNC: 79 MG/DL (ref 34–140)
TSH SERPL-ACNC: 3.74 UIU/ML (ref 0.35–4.94)
VLDLC SERPL CALC-MCNC: 16 MG/DL
WBC # BLD AUTO: 7.21 X10(3)/MCL (ref 4.5–11.5)

## 2024-07-17 PROCEDURE — 80061 LIPID PANEL: CPT

## 2024-07-17 PROCEDURE — 99214 OFFICE O/P EST MOD 30 MIN: CPT | Mod: PBBFAC

## 2024-07-17 PROCEDURE — 82306 VITAMIN D 25 HYDROXY: CPT

## 2024-07-17 PROCEDURE — 80053 COMPREHEN METABOLIC PANEL: CPT

## 2024-07-17 PROCEDURE — 36415 COLL VENOUS BLD VENIPUNCTURE: CPT

## 2024-07-17 PROCEDURE — 85025 COMPLETE CBC W/AUTO DIFF WBC: CPT

## 2024-07-17 PROCEDURE — 83036 HEMOGLOBIN GLYCOSYLATED A1C: CPT

## 2024-07-17 PROCEDURE — 84443 ASSAY THYROID STIM HORMONE: CPT

## 2024-07-17 PROCEDURE — 86803 HEPATITIS C AB TEST: CPT

## 2024-07-17 PROCEDURE — 87389 HIV-1 AG W/HIV-1&-2 AB AG IA: CPT

## 2024-07-17 PROCEDURE — 84439 ASSAY OF FREE THYROXINE: CPT

## 2024-07-17 RX ORDER — HYDROCHLOROTHIAZIDE 12.5 MG/1
12.5 TABLET ORAL DAILY
Qty: 30 TABLET | Refills: 2 | Status: SHIPPED | OUTPATIENT
Start: 2024-07-17 | End: 2024-10-15

## 2024-07-17 RX ORDER — SERTRALINE HYDROCHLORIDE 25 MG/1
25 TABLET, FILM COATED ORAL DAILY
Qty: 60 TABLET | Refills: 0 | Status: SHIPPED | OUTPATIENT
Start: 2024-07-17

## 2024-07-17 RX ORDER — SILDENAFIL 50 MG/1
50 TABLET, FILM COATED ORAL DAILY PRN
Qty: 30 TABLET | Refills: 2 | Status: SHIPPED | OUTPATIENT
Start: 2024-07-17 | End: 2024-10-15

## 2024-07-17 RX ORDER — ERGOCALCIFEROL 1.25 MG/1
50000 CAPSULE ORAL
Qty: 16 CAPSULE | Refills: 3 | Status: SHIPPED | OUTPATIENT
Start: 2024-07-17 | End: 2025-07-17

## 2024-07-18 NOTE — PROGRESS NOTES
Family Medicine Clinic Note     Subjective     Patient ID: Avi Crawford Jr is a 44 y.o. male.    Chief Complaint: Hypertension and Medication Refill    Pt is a 43 yo M presents to clinic for routine follow up.    HTN:  -last apt changed lisinopril to HCTZ due to edema in BLE; swelling has resolved since stopping the medication  -BP wnl today   -Denied any chest pain, palpitations, SOB on exertion     Worry and increased thinking:   -Has been present since he had his back surgery 2022  -Feels like he can't shut his mind off; causes him to be very distracted  -Denied any acute panic attacks  -Tried Prozac 10mg however states he did not see any changes with that. Wants to try a different medication today.   -Would be open to talking with a therapist. Previously seeing one off of [facility]; unsure if he is able to go back to him given worker's comp situation.   -Denies any SI/HI    PMHx:   Spinal fusion and L knee OA: following with surgeon for back and knee; Dr Santana manages his pain.  HTN - change amlodipine to HCTZ  ED - on Viagra tolerates well    Review of Systems   Constitutional:  Negative for malaise/fatigue.   Eyes:  Negative for blurred vision.   Respiratory:  Negative for shortness of breath.    Cardiovascular:  Negative for chest pain, palpitations, orthopnea and PND.   Musculoskeletal:  Negative for neck pain.   Neurological:  Negative for headaches.   ROS submitted via pt prior to apt     Objective     Vitals:    07/17/24 1038   BP: 135/78   BP Location: Right arm   Patient Position: Sitting   BP Method: Large (Automatic)   Pulse: 74   Temp: 98.8 °F (37.1 °C)   TempSrc: Oral   SpO2: 100%   Weight: 111.7 kg (246 lb 3.2 oz)   Height: 6' (1.829 m)       Medication List with Changes/Refills   New Medications    SERTRALINE (ZOLOFT) 25 MG TABLET    Take 1 tablet (25 mg total) by mouth once daily.   Current Medications    CYCLOBENZAPRINE (FLEXERIL) 10 MG TABLET    Take 10 mg by mouth 3 (three) times daily.     HYDROCODONE-ACETAMINOPHEN (NORCO)  MG PER TABLET    Take 1 tablet by mouth 4 (four) times daily.    MELOXICAM (MOBIC) 15 MG TABLET    Take 15 mg by mouth once daily.    NALOXONE (NARCAN) 4 MG/ACTUATION SPRY    SMARTSIG:Spray(s) In Nostril   Changed and/or Refilled Medications    Modified Medication Previous Medication    HYDROCHLOROTHIAZIDE (HYDRODIURIL) 12.5 MG TAB hydroCHLOROthiazide (HYDRODIURIL) 12.5 MG Tab       Take 1 tablet (12.5 mg total) by mouth once daily.    Take 1 tablet (12.5 mg total) by mouth once daily.    SILDENAFIL (VIAGRA) 50 MG TABLET sildenafiL (VIAGRA) 50 MG tablet       Take 1 tablet (50 mg total) by mouth daily as needed for Erectile Dysfunction.    Take 1 tablet (50 mg total) by mouth daily as needed for Erectile Dysfunction.   Discontinued Medications    FLUOXETINE 10 MG CAPSULE    Take 1 capsule (10 mg total) by mouth once daily.      Physical Exam  Vitals and nursing note reviewed.   Constitutional:       General: He is not in acute distress.     Appearance: Normal appearance.   Cardiovascular:      Rate and Rhythm: Normal rate and regular rhythm.      Heart sounds: No murmur heard.  Pulmonary:      Effort: Pulmonary effort is normal. No respiratory distress.      Breath sounds: No wheezing.   Chest:      Chest wall: No tenderness.   Abdominal:      Palpations: Abdomen is soft.      Tenderness: There is no abdominal tenderness. There is no guarding.   Musculoskeletal:      Comments: Back brace in place  Knee brace on L knee       Assessment and Plan      1. Anxiety    2. Hypertension, unspecified type    3. Problem with sexual function      -Tolerating HCTZ well at this time. Swelling in BLE has resolved and not returned.   -Pt did not like Prozac; has been off of it for multiple months. Would like to try a different medication even though he was on the lowest dose. Will change him to Seroquel. Medication discussed in depth with pt and he is agreeable to trial. Pt also agreeable to  talk with therapist. Will send referral to psychologist. Pt will see if he is able to go back to provider off of [facility] prior to referral being sent.   -Updated lab work ordered.   -Refill of medications sent today.   -Continue follow ups with Dr Santana for back and knee pain.     Orders:   -     sertraline (ZOLOFT) 25 MG tablet; Take 1 tablet (25 mg total) by mouth once daily.  -     Vitamin D  -     HIV 1/2 Ag/Ab (4th Gen)  -     Hepatitis C Antibody  -     Lipid Panel  -     Hemoglobin A1C  -     T4, Free  -     TSH  -     Comprehensive Metabolic Panel  -     CBC Auto Differential  -     hydroCHLOROthiazide (HYDRODIURIL) 12.5 MG Tab; Take 1 tablet (12.5 mg total) by mouth once daily.  -     sildenafiL (VIAGRA) 50 MG tablet; Take 1 tablet (50 mg total) by mouth daily as needed for Erectile Dysfunction     Follow up in about 4 weeks (around 8/14/2024) for anxiety.    Health Maintenance    Blood Work - 7/2024  Immunizations - UTD      Aman Quach MD  [facility]

## 2024-09-19 DIAGNOSIS — F41.9 ANXIETY: ICD-10-CM

## 2024-09-19 RX ORDER — SERTRALINE HYDROCHLORIDE 25 MG/1
25 TABLET, FILM COATED ORAL DAILY
Qty: 60 TABLET | Refills: 0 | Status: SHIPPED | OUTPATIENT
Start: 2024-09-19

## 2024-10-10 ENCOUNTER — OFFICE VISIT (OUTPATIENT)
Dept: FAMILY MEDICINE | Facility: CLINIC | Age: 44
End: 2024-10-10

## 2024-10-10 VITALS
SYSTOLIC BLOOD PRESSURE: 121 MMHG | HEART RATE: 76 BPM | HEIGHT: 72 IN | OXYGEN SATURATION: 98 % | TEMPERATURE: 99 F | WEIGHT: 241 LBS | BODY MASS INDEX: 32.64 KG/M2 | DIASTOLIC BLOOD PRESSURE: 72 MMHG

## 2024-10-10 DIAGNOSIS — E55.9 VITAMIN D DEFICIENCY: ICD-10-CM

## 2024-10-10 DIAGNOSIS — I10 HYPERTENSION, UNSPECIFIED TYPE: ICD-10-CM

## 2024-10-10 DIAGNOSIS — F41.9 ANXIETY: Primary | ICD-10-CM

## 2024-10-10 PROCEDURE — 99214 OFFICE O/P EST MOD 30 MIN: CPT | Mod: PBBFAC

## 2024-10-10 RX ORDER — AMITRIPTYLINE HYDROCHLORIDE 25 MG/1
25 TABLET, FILM COATED ORAL 3 TIMES DAILY
COMMUNITY
Start: 2024-09-24

## 2024-10-10 RX ORDER — HYDROCHLOROTHIAZIDE 12.5 MG/1
12.5 TABLET ORAL DAILY
Qty: 90 TABLET | Refills: 3 | Status: SHIPPED | OUTPATIENT
Start: 2024-10-10

## 2024-10-10 RX ORDER — ERGOCALCIFEROL 1.25 MG/1
50000 CAPSULE ORAL
Qty: 16 CAPSULE | Refills: 3 | Status: SHIPPED | OUTPATIENT
Start: 2024-10-10 | End: 2025-10-10

## 2024-10-10 NOTE — PROGRESS NOTES
Family Medicine Clinic Note     Subjective     Patient ID: Avi Crawford Jr is a 44 y.o. male.    Chief Complaint: Follow-up (HTN, Chronic Pain, Anxiety)    Pt is a 45 yo M presents to clinic for routine follow up.    HTN:  -Continues to tolerate his HCTZ well  -BP wnl today   -Denied any chest pain, palpitations, SOB on exertion     Anxiety:   -Has been present since he had his back surgery 2022  -Feels like he can't shut his mind off; causes him to be very distracted  -Denied any acute panic attacks  -Failed Prozac and Zoloft because of side effects. Not interested in trying other medications at this time.   -Would be open to talking with a therapist. Previously seeing one off of [facility]; referral has been placed by other provider for pt to return to that therapist.   -Denies any SI/HI    Vit D Def:  -Tolerating supplement well   -States he feel he has more energy since starting medication    PMHx:   Spinal fusion and L knee OA: following with surgeon for back and knee; Dr Santana manages his pain.  HTN - HCTZ  ED - on Viagra tolerates well    Review of Systems   Constitutional:  Negative for malaise/fatigue.   Eyes:  Negative for blurred vision.   Respiratory:  Negative for shortness of breath.    Cardiovascular:  Negative for chest pain, palpitations, orthopnea and PND.   Musculoskeletal:  Negative for neck pain.   Neurological:  Negative for headaches.   ROS submitted via pt prior to apt     Objective     Vitals:    10/10/24 1421   BP: 121/72   Pulse: 76   Temp: 98.5 °F (36.9 °C)   TempSrc: Oral   SpO2: 98%   Weight: 109.3 kg (241 lb)   Height: 6' (1.829 m)       Current Outpatient Medications   Medication Instructions    amitriptyline (ELAVIL) 25 mg, 3 times daily    cyclobenzaprine (FLEXERIL) 10 mg, Oral, 3 times daily    ergocalciferol (ERGOCALCIFEROL) 50,000 Units, Oral, Every 7 days    hydroCHLOROthiazide (HYDRODIURIL) 12.5 mg, Oral, Daily    HYDROcodone-acetaminophen (NORCO)  mg per tablet 1  tablet, Oral, 4 times daily    meloxicam (MOBIC) 15 mg, Oral, Daily    naloxone (NARCAN) 4 mg/actuation [place] SMARTSIG:Spray(s) In Nostril    sildenafiL (VIAGRA) 50 mg, Oral, Daily PRN         Physical Exam  Vitals and nursing note reviewed.   Constitutional:       General: He is not in acute distress.     Appearance: Normal appearance.   Cardiovascular:      Rate and Rhythm: Normal rate and regular rhythm.      Heart sounds: No murmur heard.  Pulmonary:      Effort: Pulmonary effort is normal. No respiratory distress.      Breath sounds: No wheezing.   Chest:      Chest wall: No tenderness.   Abdominal:      Palpations: Abdomen is soft.      Tenderness: There is no abdominal tenderness. There is no guarding.   Musculoskeletal:      Comments: Back brace in place  Knee brace on L knee       Assessment and Plan      1. Anxiety    2. Vitamin D deficiency    3. Hypertension, unspecified type      -Pt did not like Zoloft; has been off of it for multiple weeks. He would like to hold off on any other medications at this time. Pt agreeable to talk with therapist. Ortho doctor sent referral to previous therapist for him 2 days ago. Denies any SI/HI   -Refill of medications sent today.   -BP well controlled on his medication.   -Continue follow ups with Dr Santana for back and knee pain. Will likely need surgery sometime next year.     Orders:   -     ergocalciferol (ERGOCALCIFEROL) 50,000 unit Cap; Take 1 capsule (50,000 Units total) by mouth every 7 days.  -     hydroCHLOROthiazide (HYDRODIURIL) 12.5 MG Tab; Take 1 tablet (12.5 mg total) by mouth once daily.          Follow up in about 3 months (around 1/10/2025).    Health Maintenance    Blood Work - 7/2024  Immunizations - UTD      Aman Quach MD  [facility]

## 2025-05-12 ENCOUNTER — OFFICE VISIT (OUTPATIENT)
Dept: FAMILY MEDICINE | Facility: CLINIC | Age: 45
End: 2025-05-12

## 2025-05-12 VITALS
HEIGHT: 72 IN | TEMPERATURE: 97 F | OXYGEN SATURATION: 100 % | RESPIRATION RATE: 20 BRPM | HEART RATE: 79 BPM | DIASTOLIC BLOOD PRESSURE: 86 MMHG | WEIGHT: 245.63 LBS | BODY MASS INDEX: 33.27 KG/M2 | SYSTOLIC BLOOD PRESSURE: 131 MMHG

## 2025-05-12 DIAGNOSIS — E55.9 VITAMIN D DEFICIENCY: ICD-10-CM

## 2025-05-12 DIAGNOSIS — I10 HYPERTENSION, UNSPECIFIED TYPE: Primary | ICD-10-CM

## 2025-05-12 DIAGNOSIS — F52.9 PROBLEM WITH SEXUAL FUNCTION: ICD-10-CM

## 2025-05-12 PROCEDURE — 99214 OFFICE O/P EST MOD 30 MIN: CPT | Mod: PBBFAC

## 2025-05-12 RX ORDER — SILDENAFIL 50 MG/1
50 TABLET, FILM COATED ORAL DAILY PRN
Qty: 30 TABLET | Refills: 2 | Status: SHIPPED | OUTPATIENT
Start: 2025-05-12 | End: 2025-08-10

## 2025-05-12 RX ORDER — HYDROCHLOROTHIAZIDE 12.5 MG/1
12.5 TABLET ORAL DAILY
Qty: 90 TABLET | Refills: 3 | Status: SHIPPED | OUTPATIENT
Start: 2025-05-12

## 2025-05-12 RX ORDER — ERGOCALCIFEROL 1.25 MG/1
50000 CAPSULE ORAL
Qty: 16 CAPSULE | Refills: 3 | Status: SHIPPED | OUTPATIENT
Start: 2025-05-12 | End: 2026-05-12

## 2025-05-12 NOTE — PROGRESS NOTES
Family Medicine Clinic Note     Subjective     Patient ID: Avi Crawford Jr is a 45 y.o. male.    Chief Complaint: Follow-up and Medication Refill (Rx for Viagra )    Pt is a 46 yo M presents to clinic for routine follow up.    He has not had any ED visits since his last apt.   Will be following up with Ortho surgeon for knee replacement.     HTN:  -BP wnl today  -Tolerates his medication well  -Denied any chest pain, palpitations, dyspnea on exertion     Anxiety:   -Has been present since he had his back surgery 2022  -Failed Prozac and Zoloft because of side effects. Not interested in trying other medications at this time.   -Following up with his therapist; needs to follow through with scheduling his apt    Needs refill on his Viagra.     PMHx:   Spinal fusion and L knee OA: following with surgeon for back and knee; Dr Santana manages his pain.  HTN - HCTZ  ED - on Viagra tolerates well    Review of Systems   Constitutional:  Negative for chills, fever and malaise/fatigue.   Respiratory:  Negative for cough and shortness of breath.    Cardiovascular:  Negative for chest pain and palpitations.   Gastrointestinal:  Negative for abdominal pain.   Musculoskeletal:  Positive for back pain and joint pain.   Neurological:  Negative for headaches.   ROS submitted via pt prior to apt     Objective     Vitals:    05/12/25 1312   BP: 131/86   BP Location: Right arm   Patient Position: Sitting   Pulse: 79   Resp: 20   Temp: 97.4 °F (36.3 °C)   TempSrc: Oral   SpO2: 100%   Weight: 111.4 kg (245 lb 9.6 oz)   Height: 6' (1.829 m)         Physical Exam  Vitals and nursing note reviewed.   Constitutional:       General: He is not in acute distress.  Cardiovascular:      Rate and Rhythm: Normal rate and regular rhythm.      Heart sounds: No murmur heard.  Pulmonary:      Effort: Pulmonary effort is normal.      Breath sounds: No wheezing or rhonchi.   Neurological:      Mental Status: He is alert.       Assessment and Plan       1. Hypertension, unspecified type    2. Problem with sexual function    3. Vitamin D deficiency      -Continue current BP medications.   -Refill of Viagra sent  -Continue follow ups with Dr Santana for back and knee pain. Will likely need surgery sometime next year.   -Will need blood work next apt     Orders:   -     sildenafiL (VIAGRA) 50 MG tablet; Take 1 tablet (50 mg total) by mouth daily as needed for Erectile Dysfunction.  -     hydroCHLOROthiazide 12.5 MG Tab; Take 1 tablet (12.5 mg total) by mouth once daily.  -     ergocalciferol (ERGOCALCIFEROL) 50,000 unit Cap; Take 1 capsule (50,000 Units total) by mouth every 7 days.      Follow up in about 3 months (around 8/12/2025).    Health Maintenance    Blood Work - 7/2024  Immunizations - UTD      Aman Quach MD  [facility]

## 2025-05-13 NOTE — PROGRESS NOTES
I have reviewed the Resident's history and physical, assessment, plan, and progress note. I agree with the findings.       Neptali Toro MD  [facility]